# Patient Record
Sex: MALE | Race: WHITE | NOT HISPANIC OR LATINO | Employment: OTHER | URBAN - METROPOLITAN AREA
[De-identification: names, ages, dates, MRNs, and addresses within clinical notes are randomized per-mention and may not be internally consistent; named-entity substitution may affect disease eponyms.]

---

## 2021-04-20 ENCOUNTER — APPOINTMENT (OUTPATIENT)
Dept: RADIOLOGY | Facility: MEDICAL CENTER | Age: 31
End: 2021-04-20
Attending: EMERGENCY MEDICINE

## 2021-04-20 ENCOUNTER — HOSPITAL ENCOUNTER (EMERGENCY)
Facility: MEDICAL CENTER | Age: 31
End: 2021-04-20
Attending: EMERGENCY MEDICINE | Admitting: EMERGENCY MEDICINE

## 2021-04-20 VITALS
BODY MASS INDEX: 24.65 KG/M2 | TEMPERATURE: 97.2 F | HEIGHT: 72 IN | HEART RATE: 59 BPM | DIASTOLIC BLOOD PRESSURE: 82 MMHG | WEIGHT: 182 LBS | RESPIRATION RATE: 18 BRPM | SYSTOLIC BLOOD PRESSURE: 119 MMHG | OXYGEN SATURATION: 98 %

## 2021-04-20 DIAGNOSIS — T14.8XXA WOUND INFECTION: ICD-10-CM

## 2021-04-20 DIAGNOSIS — L08.9 WOUND INFECTION: ICD-10-CM

## 2021-04-20 PROCEDURE — 303977 HCHG I & D

## 2021-04-20 PROCEDURE — 73130 X-RAY EXAM OF HAND: CPT | Mod: LT

## 2021-04-20 PROCEDURE — 700102 HCHG RX REV CODE 250 W/ 637 OVERRIDE(OP): Performed by: EMERGENCY MEDICINE

## 2021-04-20 PROCEDURE — 304217 HCHG IRRIGATION SYSTEM

## 2021-04-20 PROCEDURE — 700101 HCHG RX REV CODE 250: Performed by: EMERGENCY MEDICINE

## 2021-04-20 PROCEDURE — A9270 NON-COVERED ITEM OR SERVICE: HCPCS | Performed by: EMERGENCY MEDICINE

## 2021-04-20 PROCEDURE — 99283 EMERGENCY DEPT VISIT LOW MDM: CPT

## 2021-04-20 PROCEDURE — 73130 X-RAY EXAM OF HAND: CPT | Mod: RT

## 2021-04-20 RX ORDER — CEPHALEXIN 500 MG/1
500 CAPSULE ORAL 4 TIMES DAILY
Qty: 28 CAPSULE | Refills: 0 | Status: SHIPPED | OUTPATIENT
Start: 2021-04-20 | End: 2021-04-27

## 2021-04-20 RX ORDER — LIDOCAINE HYDROCHLORIDE 20 MG/ML
20 INJECTION, SOLUTION INFILTRATION; PERINEURAL ONCE
Status: COMPLETED | OUTPATIENT
Start: 2021-04-20 | End: 2021-04-20

## 2021-04-20 RX ORDER — IBUPROFEN 600 MG/1
600 TABLET ORAL ONCE
Status: COMPLETED | OUTPATIENT
Start: 2021-04-20 | End: 2021-04-20

## 2021-04-20 RX ORDER — SULFAMETHOXAZOLE AND TRIMETHOPRIM 800; 160 MG/1; MG/1
1 TABLET ORAL 2 TIMES DAILY
Qty: 14 TABLET | Refills: 0 | Status: SHIPPED | OUTPATIENT
Start: 2021-04-20 | End: 2021-04-27

## 2021-04-20 RX ORDER — CEPHALEXIN 250 MG/1
500 CAPSULE ORAL ONCE
Status: COMPLETED | OUTPATIENT
Start: 2021-04-20 | End: 2021-04-20

## 2021-04-20 RX ADMIN — CEPHALEXIN 500 MG: 250 CAPSULE ORAL at 15:46

## 2021-04-20 RX ADMIN — LIDOCAINE HYDROCHLORIDE 3 ML: 20 INJECTION, SOLUTION INFILTRATION; PERINEURAL at 15:30

## 2021-04-20 RX ADMIN — IBUPROFEN 600 MG: 600 TABLET, FILM COATED ORAL at 15:47
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
## 2021-04-20 NOTE — ED NOTES
Agreeable to discharge.  Follow up instructions discussed and understanding verbalized.  Ambulatory out of ED.  RX x 1 given.